# Patient Record
Sex: FEMALE | Race: WHITE | NOT HISPANIC OR LATINO | URBAN - METROPOLITAN AREA
[De-identification: names, ages, dates, MRNs, and addresses within clinical notes are randomized per-mention and may not be internally consistent; named-entity substitution may affect disease eponyms.]

---
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
## 2023-03-14 ENCOUNTER — OFFICE VISIT (OUTPATIENT)
Dept: INTERNAL MEDICINE | Facility: CLINIC | Age: 23
End: 2023-03-14
Payer: MEDICAID

## 2023-03-14 ENCOUNTER — LAB (OUTPATIENT)
Dept: INTERNAL MEDICINE | Facility: CLINIC | Age: 23
End: 2023-03-14
Payer: MEDICAID

## 2023-03-14 VITALS
BODY MASS INDEX: 28.08 KG/M2 | DIASTOLIC BLOOD PRESSURE: 52 MMHG | WEIGHT: 152.6 LBS | SYSTOLIC BLOOD PRESSURE: 90 MMHG | HEART RATE: 67 BPM | OXYGEN SATURATION: 100 % | RESPIRATION RATE: 18 BRPM | HEIGHT: 62 IN | TEMPERATURE: 98.7 F

## 2023-03-14 DIAGNOSIS — Z11.3 SCREEN FOR STD (SEXUALLY TRANSMITTED DISEASE): Primary | ICD-10-CM

## 2023-03-14 DIAGNOSIS — R59.1 LYMPHADENOPATHY: ICD-10-CM

## 2023-03-14 DIAGNOSIS — Z13.220 SCREENING, LIPID: ICD-10-CM

## 2023-03-14 DIAGNOSIS — Z11.3 SCREEN FOR STD (SEXUALLY TRANSMITTED DISEASE): ICD-10-CM

## 2023-03-14 DIAGNOSIS — Z23 NEED FOR VACCINATION: ICD-10-CM

## 2023-03-14 DIAGNOSIS — Z13.29 SCREENING FOR ENDOCRINE DISORDER: ICD-10-CM

## 2023-03-14 LAB
ALBUMIN SERPL-MCNC: 4.4 G/DL (ref 3.5–5.2)
ALBUMIN/GLOB SERPL: 1.2 G/DL
ALP SERPL-CCNC: 51 U/L (ref 39–117)
ALT SERPL W P-5'-P-CCNC: 11 U/L (ref 1–33)
ANION GAP SERPL CALCULATED.3IONS-SCNC: 11.9 MMOL/L (ref 5–15)
AST SERPL-CCNC: 24 U/L (ref 1–32)
BASOPHILS # BLD AUTO: 0.04 10*3/MM3 (ref 0–0.2)
BASOPHILS NFR BLD AUTO: 0.6 % (ref 0–1.5)
BILIRUB SERPL-MCNC: <0.2 MG/DL (ref 0–1.2)
BUN SERPL-MCNC: 11 MG/DL (ref 6–20)
BUN/CREAT SERPL: 20.4 (ref 7–25)
CALCIUM SPEC-SCNC: 10 MG/DL (ref 8.6–10.5)
CHLORIDE SERPL-SCNC: 104 MMOL/L (ref 98–107)
CHOLEST SERPL-MCNC: 112 MG/DL (ref 0–200)
CO2 SERPL-SCNC: 26.1 MMOL/L (ref 22–29)
CREAT SERPL-MCNC: 0.54 MG/DL (ref 0.57–1)
DEPRECATED RDW RBC AUTO: 43.5 FL (ref 37–54)
EGFRCR SERPLBLD CKD-EPI 2021: 134.5 ML/MIN/1.73
EOSINOPHIL # BLD AUTO: 0.02 10*3/MM3 (ref 0–0.4)
EOSINOPHIL NFR BLD AUTO: 0.3 % (ref 0.3–6.2)
ERYTHROCYTE [DISTWIDTH] IN BLOOD BY AUTOMATED COUNT: 13.9 % (ref 12.3–15.4)
GLOBULIN UR ELPH-MCNC: 3.7 GM/DL
GLUCOSE SERPL-MCNC: 65 MG/DL (ref 65–99)
HAV IGM SERPL QL IA: NORMAL
HBV CORE IGM SERPL QL IA: NORMAL
HBV SURFACE AG SERPL QL IA: NORMAL
HCT VFR BLD AUTO: 36.9 % (ref 34–46.6)
HCV AB SER DONR QL: NORMAL
HDLC SERPL-MCNC: 38 MG/DL (ref 40–60)
HGB BLD-MCNC: 12.1 G/DL (ref 12–15.9)
HIV1+2 AB SER QL: NORMAL
IMM GRANULOCYTES # BLD AUTO: 0.02 10*3/MM3 (ref 0–0.05)
IMM GRANULOCYTES NFR BLD AUTO: 0.3 % (ref 0–0.5)
LDLC SERPL CALC-MCNC: 42 MG/DL (ref 0–100)
LDLC/HDLC SERPL: 0.88 {RATIO}
LYMPHOCYTES # BLD AUTO: 1.92 10*3/MM3 (ref 0.7–3.1)
LYMPHOCYTES NFR BLD AUTO: 31.1 % (ref 19.6–45.3)
MCH RBC QN AUTO: 28.9 PG (ref 26.6–33)
MCHC RBC AUTO-ENTMCNC: 32.8 G/DL (ref 31.5–35.7)
MCV RBC AUTO: 88.1 FL (ref 79–97)
MONOCYTES # BLD AUTO: 0.34 10*3/MM3 (ref 0.1–0.9)
MONOCYTES NFR BLD AUTO: 5.5 % (ref 5–12)
NEUTROPHILS NFR BLD AUTO: 3.84 10*3/MM3 (ref 1.7–7)
NEUTROPHILS NFR BLD AUTO: 62.2 % (ref 42.7–76)
NRBC BLD AUTO-RTO: 0 /100 WBC (ref 0–0.2)
PLATELET # BLD AUTO: 303 10*3/MM3 (ref 140–450)
PMV BLD AUTO: 10.3 FL (ref 6–12)
POTASSIUM SERPL-SCNC: 3.9 MMOL/L (ref 3.5–5.2)
PROT SERPL-MCNC: 8.1 G/DL (ref 6–8.5)
RBC # BLD AUTO: 4.19 10*6/MM3 (ref 3.77–5.28)
SODIUM SERPL-SCNC: 142 MMOL/L (ref 136–145)
TRIGL SERPL-MCNC: 202 MG/DL (ref 0–150)
TSH SERPL DL<=0.05 MIU/L-ACNC: 0.99 UIU/ML (ref 0.27–4.2)
VLDLC SERPL-MCNC: 32 MG/DL (ref 5–40)
WBC NRBC COR # BLD: 6.18 10*3/MM3 (ref 3.4–10.8)

## 2023-03-14 PROCEDURE — 80061 LIPID PANEL: CPT | Performed by: PHYSICIAN ASSISTANT

## 2023-03-14 PROCEDURE — 86592 SYPHILIS TEST NON-TREP QUAL: CPT | Performed by: PHYSICIAN ASSISTANT

## 2023-03-14 PROCEDURE — 84443 ASSAY THYROID STIM HORMONE: CPT | Performed by: PHYSICIAN ASSISTANT

## 2023-03-14 PROCEDURE — 90471 IMMUNIZATION ADMIN: CPT | Performed by: PHYSICIAN ASSISTANT

## 2023-03-14 PROCEDURE — 87491 CHLMYD TRACH DNA AMP PROBE: CPT | Performed by: PHYSICIAN ASSISTANT

## 2023-03-14 PROCEDURE — 99203 OFFICE O/P NEW LOW 30 MIN: CPT | Performed by: PHYSICIAN ASSISTANT

## 2023-03-14 PROCEDURE — 1160F RVW MEDS BY RX/DR IN RCRD: CPT | Performed by: PHYSICIAN ASSISTANT

## 2023-03-14 PROCEDURE — 1159F MED LIST DOCD IN RCRD: CPT | Performed by: PHYSICIAN ASSISTANT

## 2023-03-14 PROCEDURE — 90715 TDAP VACCINE 7 YRS/> IM: CPT | Performed by: PHYSICIAN ASSISTANT

## 2023-03-14 PROCEDURE — G0432 EIA HIV-1/HIV-2 SCREEN: HCPCS | Performed by: PHYSICIAN ASSISTANT

## 2023-03-14 PROCEDURE — 87661 TRICHOMONAS VAGINALIS AMPLIF: CPT | Performed by: PHYSICIAN ASSISTANT

## 2023-03-14 PROCEDURE — 36415 COLL VENOUS BLD VENIPUNCTURE: CPT | Performed by: PHYSICIAN ASSISTANT

## 2023-03-14 PROCEDURE — 87591 N.GONORRHOEAE DNA AMP PROB: CPT | Performed by: PHYSICIAN ASSISTANT

## 2023-03-14 PROCEDURE — 80074 ACUTE HEPATITIS PANEL: CPT | Performed by: PHYSICIAN ASSISTANT

## 2023-03-14 PROCEDURE — 80053 COMPREHEN METABOLIC PANEL: CPT | Performed by: PHYSICIAN ASSISTANT

## 2023-03-14 PROCEDURE — 85025 COMPLETE CBC W/AUTO DIFF WBC: CPT | Performed by: PHYSICIAN ASSISTANT

## 2023-03-14 NOTE — PROGRESS NOTES
Chief Complaint  Establish Care, lymphnode (Had increased swelling in lymph nodes on the right side of her neck, have improved since scheduling appt ), and STD Screening (No known exposures, just wants screening)    Subjective          History of Present Illness  Stef Garrido presents to [facility] to establish care.  History of Present Illness  Lymphadenopathy:  Had fever 101 and knots on her neck last week. Had cold chills as well. No n/v/d. No cough or runny nose, no h/a or body aches. No fever since last Wednesday. No sick contacts.     STD screening:  Hx of chlamydia, did complete treatment and retesting and it was negative. She did have pos HSV 2 test in blood but no hx of lesions that she knows of. No sx of STD, no vaginal discharge or irritation. No lesions. No concern for exposure to STD but did have one night stand recently, did use a condom with that person. Does have a regular partner that she trusts that she does not use condom with. No concern for pregnancy, last period 2.26.23.      The following portions of the patient's history were reviewed and updated as appropriate: allergies, current medications, past family history, past medical history, past social history, past surgical history and problem list.    No Known Allergies  No current outpatient medications on file.  No orders of the defined types were placed in this encounter.    Past Medical History:   Diagnosis Date   • Anxiety    • Bipolar affective disorder (HCC)    • Depression    • HSV-2 (herpes simplex virus 2) infection    • Intermittent explosive       History reviewed. No pertinent surgical history.   Family History   Problem Relation Age of Onset   • Colon cancer Mother    • Alcohol abuse Father    • Mental illness Father    • Anxiety disorder Sister    • Depression Sister    • Arthritis Paternal Aunt    • Diabetes Paternal Aunt    • Bipolar disorder Paternal Aunt    • Schizophrenia Paternal Aunt    • Colon  "cancer Paternal Grandmother    • Diabetes Paternal Grandmother    • Hypertension Paternal Grandmother    • Kidney disease Paternal Grandmother       Social History     Socioeconomic History   • Marital status: Unknown   Tobacco Use   • Smoking status: Never   • Smokeless tobacco: Never   Vaping Use   • Vaping Use: Every day   • Start date: 1/1/2017   Substance and Sexual Activity   • Alcohol use: Not Currently     Comment: quit 12/30/2022   • Drug use: Yes     Types: Marijuana   • Sexual activity: Yes     Partners: Male        Objective   Vital Signs:   Vitals:    03/14/23 1052   BP: 90/52   Pulse: 67   Resp: 18   Temp: 98.7 °F (37.1 °C)   TempSrc: Infrared   SpO2: 100%   Weight: 69.2 kg (152 lb 9.6 oz)   Height: 157.5 cm (62\")      Body mass index is 27.91 kg/m².  Physical Exam  Vitals reviewed.   Constitutional:       General: She is not in acute distress.     Appearance: Normal appearance.   HENT:      Head: Normocephalic and atraumatic.   Eyes:      General: No scleral icterus.     Extraocular Movements: Extraocular movements intact.      Conjunctiva/sclera: Conjunctivae normal.   Cardiovascular:      Rate and Rhythm: Normal rate and regular rhythm.      Heart sounds: Normal heart sounds. No murmur heard.  Pulmonary:      Effort: Pulmonary effort is normal. No respiratory distress.      Breath sounds: Normal breath sounds. No stridor. No wheezing or rhonchi.   Musculoskeletal:      Cervical back: Normal range of motion and neck supple.   Lymphadenopathy:      Cervical: Cervical adenopathy (right sided 8mm lympnode, nontender ) present.   Skin:     General: Skin is warm and dry.      Coloration: Skin is not jaundiced.   Neurological:      General: No focal deficit present.      Mental Status: She is alert and oriented to person, place, and time.      Gait: Gait normal.   Psychiatric:         Mood and Affect: Mood normal.         Behavior: Behavior normal.          Result Review :                   Assessment and " Plan    Diagnoses and all orders for this visit:    1. Screen for STD (sexually transmitted disease) (Primary)  Assessment & Plan:  Condom use every time. STD screening today    Orders:  -     HIV-1 / O / 2 Ag / Antibody 4th Generation; Future  -     Hepatitis Panel, Acute; Future  -     RPR; Future  -     Chlamydia trachomatis, Neisseria gonorrhoeae, Trichomonas vaginalis, PCR - Urine, Urine, Clean Catch; Future  -     Comprehensive Metabolic Panel; Future  -     CBC Auto Differential; Future    2. Lymphadenopathy  Assessment & Plan:  Improving, monitor for future occurrence, likely related to recent viral inf.       3. Screening, lipid  -     Lipid Panel; Future    4. Screening for endocrine disorder  -     TSH Rfx On Abnormal To Free T4; Future    5. Need for vaccination  -     Tdap Vaccine Greater Than or Equal To 8yo IM          Follow Up   Return if symptoms worsen or fail to improve.    Follow up if symptoms worsen or persist or has new or concerning symptoms, go to ER for severe symptoms.   Reviewed common medication effects and side effects and to report side effects immediately, the patient expressed good understanding.  Encouraged medication compliance and the importance of keeping scheduled follow up appointments with me and any other providers.  If a referral was made please contact our office if you have not heard about an appointment in the next 2 weeks.   If labs or images are ordered we will contact you with the results within the next week.  If you have not heard from us after a week please call our office to inquire about the results.   Patient was given instructions and counseling regarding her condition or for health maintenance advice. Please see specific information pulled into the AVS if appropriate.     Susan Holt PA-C    * Please note that portions of this note were completed with a voice recognition program.

## 2023-03-15 ENCOUNTER — PATIENT ROUNDING (BHMG ONLY) (OUTPATIENT)
Dept: INTERNAL MEDICINE | Facility: CLINIC | Age: 23
End: 2023-03-15
Payer: MEDICAID

## 2023-03-15 LAB — RPR SER QL: NORMAL

## 2023-03-15 NOTE — PROGRESS NOTES
A [employer] message has been sent to the patient for patient rounding with [facility].

## 2023-03-17 LAB
C TRACH RRNA SPEC QL NAA+PROBE: NEGATIVE
N GONORRHOEA RRNA SPEC QL NAA+PROBE: NEGATIVE
T VAGINALIS RRNA SPEC QL NAA+PROBE: NEGATIVE

## 2024-01-14 ENCOUNTER — HOSPITAL ENCOUNTER (EMERGENCY)
Facility: HOSPITAL | Age: 24
Discharge: HOME | End: 2024-01-15
Attending: STUDENT IN AN ORGANIZED HEALTH CARE EDUCATION/TRAINING PROGRAM | Admitting: STUDENT IN AN ORGANIZED HEALTH CARE EDUCATION/TRAINING PROGRAM
Payer: MEDICAID

## 2024-01-14 DIAGNOSIS — J02.8 ACUTE BACTERIAL PHARYNGITIS: Primary | ICD-10-CM

## 2024-01-14 DIAGNOSIS — B96.89 ACUTE BACTERIAL PHARYNGITIS: Primary | ICD-10-CM

## 2024-01-14 DIAGNOSIS — J02.9 SORE THROAT: ICD-10-CM

## 2024-01-14 DIAGNOSIS — J10.1 INFLUENZA A: ICD-10-CM

## 2024-01-14 DIAGNOSIS — R09.81 SINUS CONGESTION: ICD-10-CM

## 2024-01-14 PROCEDURE — 99283 EMERGENCY DEPT VISIT LOW MDM: CPT

## 2024-01-15 VITALS
TEMPERATURE: 98.2 F | OXYGEN SATURATION: 97 % | HEIGHT: 62 IN | SYSTOLIC BLOOD PRESSURE: 114 MMHG | RESPIRATION RATE: 20 BRPM | HEART RATE: 94 BPM | BODY MASS INDEX: 28.52 KG/M2 | DIASTOLIC BLOOD PRESSURE: 75 MMHG | WEIGHT: 155 LBS

## 2024-01-15 LAB
FLUAV SUBTYP SPEC NAA+PROBE: DETECTED
FLUBV RNA ISLT QL NAA+PROBE: NOT DETECTED
HETEROPH AB SER QL LA: NEGATIVE
S PYO AG THROAT QL: NEGATIVE
SARS-COV-2 RNA RESP QL NAA+PROBE: NOT DETECTED

## 2024-01-15 PROCEDURE — 25010000002 DEXAMETHASONE PER 1 MG: Performed by: STUDENT IN AN ORGANIZED HEALTH CARE EDUCATION/TRAINING PROGRAM

## 2024-01-15 PROCEDURE — 63710000001 ONDANSETRON ODT 4 MG TABLET DISPERSIBLE: Performed by: STUDENT IN AN ORGANIZED HEALTH CARE EDUCATION/TRAINING PROGRAM

## 2024-01-15 PROCEDURE — 87491 CHLMYD TRACH DNA AMP PROBE: CPT | Performed by: STUDENT IN AN ORGANIZED HEALTH CARE EDUCATION/TRAINING PROGRAM

## 2024-01-15 PROCEDURE — 87591 N.GONORRHOEAE DNA AMP PROB: CPT | Performed by: STUDENT IN AN ORGANIZED HEALTH CARE EDUCATION/TRAINING PROGRAM

## 2024-01-15 PROCEDURE — 36415 COLL VENOUS BLD VENIPUNCTURE: CPT

## 2024-01-15 PROCEDURE — 87880 STREP A ASSAY W/OPTIC: CPT | Performed by: STUDENT IN AN ORGANIZED HEALTH CARE EDUCATION/TRAINING PROGRAM

## 2024-01-15 PROCEDURE — 87636 SARSCOV2 & INF A&B AMP PRB: CPT | Performed by: STUDENT IN AN ORGANIZED HEALTH CARE EDUCATION/TRAINING PROGRAM

## 2024-01-15 PROCEDURE — 86308 HETEROPHILE ANTIBODY SCREEN: CPT | Performed by: STUDENT IN AN ORGANIZED HEALTH CARE EDUCATION/TRAINING PROGRAM

## 2024-01-15 PROCEDURE — 87081 CULTURE SCREEN ONLY: CPT | Performed by: STUDENT IN AN ORGANIZED HEALTH CARE EDUCATION/TRAINING PROGRAM

## 2024-01-15 RX ORDER — ONDANSETRON 4 MG/1
4 TABLET, ORALLY DISINTEGRATING ORAL ONCE
Status: COMPLETED | OUTPATIENT
Start: 2024-01-15 | End: 2024-01-15

## 2024-01-15 RX ORDER — CLINDAMYCIN HYDROCHLORIDE 300 MG/1
300 CAPSULE ORAL 3 TIMES DAILY
Qty: 21 CAPSULE | Refills: 0 | Status: SHIPPED | OUTPATIENT
Start: 2024-01-15 | End: 2024-01-22

## 2024-01-15 RX ORDER — ACETAMINOPHEN 160 MG/5ML
650 SOLUTION ORAL ONCE
Status: COMPLETED | OUTPATIENT
Start: 2024-01-15 | End: 2024-01-15

## 2024-01-15 RX ORDER — ONDANSETRON 4 MG/1
4 TABLET, ORALLY DISINTEGRATING ORAL 4 TIMES DAILY PRN
Qty: 12 TABLET | Refills: 0 | Status: SHIPPED | OUTPATIENT
Start: 2024-01-15

## 2024-01-15 RX ADMIN — ONDANSETRON 4 MG: 4 TABLET, ORALLY DISINTEGRATING ORAL at 00:29

## 2024-01-15 RX ADMIN — DEXAMETHASONE SODIUM PHOSPHATE 10 MG: 10 INJECTION INTRAMUSCULAR; INTRAVENOUS at 02:10

## 2024-01-15 RX ADMIN — IBUPROFEN 800 MG: 100 SUSPENSION ORAL at 00:29

## 2024-01-15 RX ADMIN — ACETAMINOPHEN 650 MG: 650 SOLUTION ORAL at 00:29

## 2024-01-15 NOTE — DISCHARGE INSTRUCTIONS
Use the provided pain and nausea medicine as well as the provided lozenges to help with symptoms.  Take the provided antibiotic as directed.  Follow-up with your PCP.  While today's workup was reassuring if your symptoms change or worsen please return to the ED or seek other medical care.

## 2024-01-15 NOTE — ED PROVIDER NOTES
EMERGENCY DEPARTMENT ENCOUNTER    Pt Name: Stef Garrido  MRN: [id number]  Pt :   2000  Room Number:  1616  Date of encounter:  2024  PCP: Susan Holt PA-C  ED Provider: Erik Byers MD    Historian: Patient      HPI:  Chief Complaint: Sore throat        Context: Stef Garrido is a 23-year-old female who presents because of flulike illness and sore throat.  She says she has been sick since Tuesday she has a family member that tested positive for influenza A.  Her main symptoms come from sore throat she is having pain with swallowing.  She has had cough but even cough aggravates her sore throat she is also had sinus congestion and generalized fatigue.  She has not appreciated any fevers.  The pain got so severe tonight that she presents here for evaluation.  No other complaints at this time.      PAST MEDICAL HISTORY  Past Medical History:   Diagnosis Date    Anxiety     Bipolar affective disorder     Depression     HSV-2 (herpes simplex virus 2) infection     Intermittent explosive          PAST SURGICAL HISTORY  No past surgical history on file.      FAMILY HISTORY  Family History   Problem Relation Age of Onset    Colon cancer Mother     Alcohol abuse Father     Mental illness Father     Anxiety disorder Sister     Depression Sister     Arthritis Paternal Aunt     Diabetes Paternal Aunt     Bipolar disorder Paternal Aunt     Schizophrenia Paternal Aunt     Colon cancer Paternal Grandmother     Diabetes Paternal Grandmother     Hypertension Paternal Grandmother     Kidney disease Paternal Grandmother          SOCIAL HISTORY  Social History     Socioeconomic History    Marital status: Unknown   Tobacco Use    Smoking status: Never    Smokeless tobacco: Never   Vaping Use    Vaping Use: Every day    Start date: 2017   Substance and Sexual Activity    Alcohol use: Not Currently     Comment: quit 2022    Drug use: Yes     Types: Marijuana    Sexual activity: Yes     Partners: Male          ALLERGIES  Patient has no known allergies.        REVIEW OF SYSTEMS  Review of Systems       All systems reviewed and negative except for those discussed in HPI.       PHYSICAL EXAM    I have reviewed the triage vital signs and nursing notes.    ED Triage Vitals [01/14/24 2330]   Temp Heart Rate Resp BP SpO2   98.2 °F (36.8 °C) 94 20 130/99 100 %      Temp src Heart Rate Source Patient Position BP Location FiO2 (%)   Oral Monitor Sitting Right arm --       Physical Exam  GENERAL:   Appears in no acute distress.   HENT: Nares patent.  Tonsillar and retropharyngeal erythema with tonsillar exudate, no unilateral swelling, soft underneath the tongue  EYES: No scleral icterus.  CV: Regular rhythm, regular rate.  RESPIRATORY: Normal effort.  No audible wheezes, rales or rhonchi.  No stridor  ABDOMEN: Soft, nontender  MUSCULOSKELETAL: No deformities.   NEURO: Alert, moves all extremities, follows commands.  SKIN: Warm, dry, no rash visualized.      LAB RESULTS  Recent Results (from the past 24 hour(s))   Rapid Strep A Screen - Swab, Throat    Collection Time: 01/15/24 12:22 AM    Specimen: Throat; Swab   Result Value Ref Range    Strep A Ag Negative Negative   COVID-19 and FLU A/B PCR, 1 HR TAT - Swab, Nasopharynx    Collection Time: 01/15/24 12:22 AM    Specimen: Nasopharynx; Swab   Result Value Ref Range    COVID19 Not Detected Not Detected - Ref. Range    Influenza A PCR Detected (A) Not Detected    Influenza B PCR Not Detected Not Detected   Mononucleosis Screen    Collection Time: 01/15/24 12:37 AM    Specimen: Blood   Result Value Ref Range    Monospot Negative Negative       If labs were ordered, I independently reviewed the results and considered them in treating the patient.        RADIOLOGY  No Radiology Exams Resulted Within Past 24 Hours    I ordered and independently reviewed the above noted radiographic studies.      See radiologist's dictation for official interpretation.         PROCEDURES    Procedures    No orders to display       MEDICATIONS GIVEN IN ER    Medications   benzocaine (HURRICAINE) 20 % liquid solution 1 spray (has no administration in time range)   dexAMETHasone (DECADRON) 10 MG/ML oral solution 10 mg (has no administration in time range)   ondansetron ODT (ZOFRAN-ODT) disintegrating tablet 4 mg (4 mg Oral Given 1/15/24 0029)   ibuprofen (ADVIL,MOTRIN) 100 MG/5ML suspension 800 mg (800 mg Oral Given 1/15/24 0029)   acetaminophen (TYLENOL) 160 MG/5ML oral solution 650 mg (650 mg Oral Given 1/15/24 0029)         MEDICAL DECISION MAKING, PROGRESS, and CONSULTS    All labs, if obtained, have been independently reviewed by me.  All radiology studies, if obtained, have been reviewed by me and the radiologist dictating the report.  All EKG's, if obtained, have been independently viewed and interpreted by me/my attending physician.      Discussion below represents my analysis of pertinent findings related to patient's condition, differential diagnosis, treatment plan and final disposition.                         Differential diagnosis:    Viral pharyngitis, bacterial pharyngitis, Chava angina, peritonsillar abscess, retropharyngeal abscess, pneumonia, influenza, mononucleosis, strep pharyngitis, GC/chlamydia pharyngitis.  Sepsis,      Additional sources:    - Discussed/ obtained information from independent historians:      - External (non-ED) record review:  Chart review shows numerous visits to [facility] for STD exposure.    - Chronic or social conditions impacting care:      - Shared decision making: Agreeable to discharge with follow-up and return precautions.      Orders placed during this visit:  Orders Placed This Encounter   Procedures    COVID PRE-OP / PRE-PROCEDURE SCREENING ORDER (NO ISOLATION) - Swab, Nasopharynx    Rapid Strep A Screen - Swab, Throat    COVID-19 and FLU A/B PCR, 1 HR TAT - Swab, Nasopharynx    Chlamydia trachomatis,  Neisseria gonorrhoeae, PCR - Swab, Oropharynx    Beta Strep Culture, Throat - Swab, Throat    Mononucleosis Screen         Additional orders considered but not ordered:  CT scan of the neck with contrast, right now she has an exam that is consistent with bacterial pharyngitis but no unilateral swelling or swelling of the tongue which suggest Chava's angina or peritonsillar abscess and she is afebrile and not septic I think this would be an unnecessary source of radiation.    ED Course:    Consultants:      ED Course as of 01/15/24 0153   Mon James 15, 2024   0009 Chart review shows numerous visits to [facility] for STD exposure.   [CC]   0009 This is a 23-year-old female who presents because of flulike illness and sore throat.  She says she has been sick since Tuesday she has a family member that tested positive for influenza A.  Her main symptoms come from sore throat she is having pain with swallowing.  She has had cough but even cough aggravates her sore throat she is also had sinus congestion and generalized fatigue.  She has not appreciated any fevers.  The pain got so severe tonight that she presents here for evaluation.  No other complaints at this time. [CC]   0010 She arrived awake and alert vitals are within normal limits she is afebrile.  Lungs are clear to auscultation, she does have sinus congestion and oropharynx shows significant erythema and cobblestoning as well as tonsillar swelling with exudate but it is symmetric there is no uvular deviation or unilateral swelling.  Soft underneath the tongue.  Treating with acetaminophen, benzocaine spray, ibuprofen and Zofran.  Obtaining COVID flu and mononucleosis screen with her numerous visits to other hospitals for STI exposure also obtaining GC/chlamydia swab of the throat. [CC]   0149 Viral panel positive for influenza she has been sick since Tuesday at this point we are about day 6 of illness and well past the point that Tamiflu would  be any benefit with the exudate I am concerned this may be a secondary bacterial pharyngitis as well though.  Strep swab is negative.  Mononucleosis screen is negative.  She remains well upon reevaluation resting comfortably.  Treating with a dose of dexamethasone here and providing a prescription for clindamycin as well as benzocaine lozenges and liquid ibuprofen she is having difficulty with swallowing.  Counseled on strict return precautions verbally expressed understanding of these. [CC]      ED Course User Index  [CC] Erik Byers MD              Shared Decision Making:  After my consideration of clinical presentation and any laboratory/radiology studies obtained, I discussed the findings with the patient/patient representative who is in agreement with the treatment plan and the final disposition.   Risks and benefits of discharge and/or observation/admission were discussed.       AS OF 01:53 EST VITALS:    BP - 114/75  HR - 94  TEMP - 98.2 °F (36.8 °C) (Oral)  O2 SATS - 97%                  DIAGNOSIS  Final diagnoses:   Acute bacterial pharyngitis   Influenza A   Sore throat   Sinus congestion         DISPOSITION  DISCHARGE    Patient discharged in stable condition.    Reviewed implications of results, diagnosis, meds, responsibility to follow up, warning signs and symptoms of possible worsening, potential complications and reasons to return to ER.    Patient/Family voiced understanding of above instructions.    Discussed plan for discharge, as there is no emergent indication for admission.  Pt/family is agreeable and understands need for follow up and possible repeat testing.  Pt/family is aware that discharge does not mean that nothing is wrong but that it indicates no emergency is currently present that requires admission and they must continue care with follow-up as given below or with a physician of their choice.     FOLLOW-UP  Susan Holt PA-C  [address]  [address]  [phone]    Call            Medication List        New Prescriptions      benzocaine-menthol 6-10 MG lozenge  Commonly known as: CHLORASEPTIC  Take 1 each by mouth Every 2 (Two) Hours As Needed for Sore Throat.     clindamycin 300 MG capsule  Commonly known as: CLEOCIN  Take 1 capsule by mouth 3 (Three) Times a Day for 7 days.     ibuprofen 100 MG/5ML suspension  Commonly known as: ADVIL,MOTRIN  Take 30 mL by mouth Every 6 (Six) Hours As Needed for Mild Pain.     ondansetron ODT 4 MG disintegrating tablet  Commonly known as: ZOFRAN-ODT  Place 1 tablet on the tongue 4 (Four) Times a Day As Needed for Nausea or Vomiting.               Where to Get Your Medications        These medications were sent to [facility] [id number] - [place], [place] - [address] [place] [address] [phone]  - [phone] [place] [address]      Phone: [phone]   benzocaine-menthol 6-10 MG lozenge  clindamycin 300 MG capsule  ibuprofen 100 MG/5ML suspension  ondansetron ODT 4 MG disintegrating tablet             Please note that portions of this document were completed with voice recognition software.        Erik Byers MD  01/15/24 0155

## 2024-01-16 LAB
C TRACH RRNA SPEC QL NAA+PROBE: NEGATIVE
N GONORRHOEA RRNA SPEC QL NAA+PROBE: NEGATIVE

## 2024-01-17 LAB — BACTERIA SPEC AEROBE CULT: NORMAL

## 2024-05-27 ENCOUNTER — HOSPITAL ENCOUNTER (EMERGENCY)
Facility: HOSPITAL | Age: 24
Discharge: HOME | End: 2024-05-27
Attending: EMERGENCY MEDICINE | Admitting: EMERGENCY MEDICINE
Payer: MEDICAID

## 2024-05-27 VITALS
OXYGEN SATURATION: 97 % | SYSTOLIC BLOOD PRESSURE: 107 MMHG | RESPIRATION RATE: 16 BRPM | HEART RATE: 63 BPM | HEIGHT: 60 IN | WEIGHT: 158 LBS | BODY MASS INDEX: 31.02 KG/M2 | TEMPERATURE: 99.2 F | DIASTOLIC BLOOD PRESSURE: 66 MMHG

## 2024-05-27 DIAGNOSIS — N12 PYELONEPHRITIS: Primary | ICD-10-CM

## 2024-05-27 LAB
B-HCG UR QL: NEGATIVE
BACTERIA UR QL AUTO: ABNORMAL /HPF
BILIRUB UR QL STRIP: NEGATIVE
CLARITY UR: ABNORMAL
COLOR UR: YELLOW
GLUCOSE UR STRIP-MCNC: NEGATIVE MG/DL
HGB UR QL STRIP.AUTO: ABNORMAL
HYALINE CASTS UR QL AUTO: ABNORMAL /LPF
KETONES UR QL STRIP: ABNORMAL
LEUKOCYTE ESTERASE UR QL STRIP.AUTO: ABNORMAL
NITRITE UR QL STRIP: NEGATIVE
PH UR STRIP.AUTO: 6 [PH] (ref 5–8)
PROT UR QL STRIP: ABNORMAL
RBC # UR STRIP: ABNORMAL /HPF
REF LAB TEST METHOD: ABNORMAL
SP GR UR STRIP: 1.02 (ref 1–1.03)
SQUAMOUS #/AREA URNS HPF: ABNORMAL /HPF
UROBILINOGEN UR QL STRIP: ABNORMAL
WBC # UR STRIP: ABNORMAL /HPF

## 2024-05-27 PROCEDURE — 81001 URINALYSIS AUTO W/SCOPE: CPT | Performed by: EMERGENCY MEDICINE

## 2024-05-27 PROCEDURE — 81025 URINE PREGNANCY TEST: CPT | Performed by: EMERGENCY MEDICINE

## 2024-05-27 PROCEDURE — 99283 EMERGENCY DEPT VISIT LOW MDM: CPT

## 2024-05-27 RX ORDER — SULFAMETHOXAZOLE AND TRIMETHOPRIM 800; 160 MG/1; MG/1
1 TABLET ORAL 2 TIMES DAILY
Qty: 20 TABLET | Refills: 0 | Status: SHIPPED | OUTPATIENT
Start: 2024-05-27 | End: 2024-06-06

## 2024-05-27 RX ORDER — SULFAMETHOXAZOLE AND TRIMETHOPRIM 800; 160 MG/1; MG/1
1 TABLET ORAL 2 TIMES DAILY
Qty: 20 TABLET | Refills: 0 | Status: SHIPPED | OUTPATIENT
Start: 2024-05-27 | End: 2024-05-27

## 2024-05-27 NOTE — DISCHARGE INSTRUCTIONS
Take medication as prescribed.  Return to the ER for worsening of symptoms.  Follow-up with your PCP.

## 2024-05-27 NOTE — FSED PROVIDER NOTE
Subjective   History of Present Illness  A 23-year-old female presents to the ER with urinary frequency for the past 3 to 4 days.  Patient does have a history of urinary tract infections in the past.  She states she has not recently been on antibiotics.  Patient denies any vaginal discharge.  No fevers.  Patient does mention she was tested a month ago for STDs and everything was found to be negative.  Patient states her last menstrual period was last week.    History provided by:  Patient      Review of Systems   Constitutional:  Positive for chills. Negative for fever.   Gastrointestinal:  Negative for abdominal pain, nausea and vomiting.   Endocrine: Positive for polyuria.   Genitourinary:  Positive for difficulty urinating and frequency. Negative for vaginal discharge.   All other systems reviewed and are negative.      Past Medical History:   Diagnosis Date    Anxiety     Bipolar affective disorder     Depression     HSV-2 (herpes simplex virus 2) infection     Intermittent explosive        Allergies   Allergen Reactions    Doxycycline Nausea And Vomiting       No past surgical history on file.    Family History   Problem Relation Age of Onset    Colon cancer Mother     Alcohol abuse Father     Mental illness Father     Anxiety disorder Sister     Depression Sister     Arthritis Paternal Aunt     Diabetes Paternal Aunt     Bipolar disorder Paternal Aunt     Schizophrenia Paternal Aunt     Colon cancer Paternal Grandmother     Diabetes Paternal Grandmother     Hypertension Paternal Grandmother     Kidney disease Paternal Grandmother        Social History     Socioeconomic History    Marital status: Single   Tobacco Use    Smoking status: Never    Smokeless tobacco: Never   Vaping Use    Vaping status: Every Day    Start date: 1/1/2017   Substance and Sexual Activity    Alcohol use: Not Currently     Comment: quit 12/30/2022    Drug use: Yes     Types: Marijuana    Sexual activity: Yes     Partners: Male            Objective   Physical Exam  Vitals and nursing note reviewed. Exam conducted with a chaperone present.   HENT:      Head: Normocephalic and atraumatic.   Eyes:      Extraocular Movements: Extraocular movements intact.      Pupils: Pupils are equal, round, and reactive to light.   Cardiovascular:      Rate and Rhythm: Normal rate and regular rhythm.      Pulses: Normal pulses.   Pulmonary:      Effort: Pulmonary effort is normal.      Breath sounds: Normal breath sounds.   Abdominal:      General: Abdomen is flat. Bowel sounds are normal.      Palpations: Abdomen is soft.      Tenderness: There is left CVA tenderness. There is no right CVA tenderness.   Musculoskeletal:         General: Normal range of motion.      Cervical back: Normal range of motion.   Skin:     General: Skin is warm and dry.   Neurological:      General: No focal deficit present.      Mental Status: She is alert and oriented to person, place, and time.   Psychiatric:         Mood and Affect: Mood normal.         Behavior: Behavior normal.         Procedures           ED Course  ED Course as of 05/27/24 1225   Mon May 27, 2024   1208 WBC, UA(!): Too Numerous to Count [WA]   1208 Bacteria, UA(!): Unable to determine due to loaded field [WA]   1208 Leukocytes, UA(!): Moderate (2+) [WA]   1209 Blood, UA(!): Large (3+) [WA]   1209 Protein, UA(!): 30 mg/dL (1+) [WA]   1221 HCG, Urine QL: Negative [WA]      ED Course User Index  [WA] Xiomara Melendrez PA-C                                           Medical Decision Making  Patient was evaluated, labs were obtained.  Nonsurgical abdomen present discharge.  Patient advised to follow-up with their PCP, call to schedule an appointment.  Return to the ER for worsening of symptoms.  Take medication as prescribed.  Patient has no further questions or concerns at discharge.     Amount and/or Complexity of Data Reviewed  Labs:  Decision-making details documented in ED Course.        Final diagnoses:    Pyelonephritis       ED Disposition  ED Disposition       ED Disposition   Discharge    Condition   Stable    Comment   --               Susan Holt PA-C  [address] [facility]  [address]  [place] [address]  [phone]      As needed         Medication List        New Prescriptions      sulfamethoxazole-trimethoprim 800-160 MG per tablet  Commonly known as: Bactrim DS  Take 1 tablet by mouth 2 (Two) Times a Day for 10 days.               Where to Get Your Medications        These medications were sent to [facility] [id number] - [place], [place] - [address] [place] [address] - [phone]  - [phone]   [address] [facility] [address], [facility] [address]      Phone: [phone]   sulfamethoxazole-trimethoprim 800-160 MG per tablet